# Patient Record
Sex: MALE | Race: BLACK OR AFRICAN AMERICAN
[De-identification: names, ages, dates, MRNs, and addresses within clinical notes are randomized per-mention and may not be internally consistent; named-entity substitution may affect disease eponyms.]

---

## 2017-12-09 ENCOUNTER — HOSPITAL ENCOUNTER (OUTPATIENT)
Dept: HOSPITAL 76 - EMS | Age: 22
End: 2017-12-09
Attending: SURGERY
Payer: COMMERCIAL

## 2017-12-09 DIAGNOSIS — S51.812A: Primary | ICD-10-CM
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
## 2017-12-30 ENCOUNTER — HOSPITAL ENCOUNTER (OUTPATIENT)
Dept: HOSPITAL 76 - EMS | Age: 22
Discharge: ANOTHER HOSPITAL | End: 2017-12-30
Attending: SURGERY
Payer: COMMERCIAL

## 2017-12-30 ENCOUNTER — HOSPITAL ENCOUNTER (EMERGENCY)
Dept: HOSPITAL 76 - ED | Age: 22
Discharge: HOME | End: 2017-12-30
Payer: COMMERCIAL

## 2017-12-30 VITALS — DIASTOLIC BLOOD PRESSURE: 87 MMHG | SYSTOLIC BLOOD PRESSURE: 127 MMHG

## 2017-12-30 DIAGNOSIS — S51.812A: Primary | ICD-10-CM
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
## 2017-12-30 DIAGNOSIS — Z91.5: ICD-10-CM
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
## 2017-12-30 DIAGNOSIS — R03.0: ICD-10-CM

## 2017-12-30 DIAGNOSIS — K21.9: ICD-10-CM
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
## 2017-12-30 DIAGNOSIS — J45.909: ICD-10-CM
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
## 2017-12-30 LAB
ALBUMIN DIAFP-MCNC: 5 G/DL (ref 3.2–5.5)
ALBUMIN/GLOB SERPL: 1.9 {RATIO} (ref 1–2.2)
ALP SERPL-CCNC: 58 IU/L (ref 42–121)
ALT SERPL W P-5'-P-CCNC: 20 IU/L (ref 10–60)
ANION GAP SERPL CALCULATED.4IONS-SCNC: 11 MMOL/L (ref 6–13)
APAP SERPL-MCNC: < 10 UG/ML (ref 10–30)
AST SERPL W P-5'-P-CCNC: 25 IU/L (ref 10–42)
BASOPHILS NFR BLD AUTO: 0 10^3/UL (ref 0–0.1)
BASOPHILS NFR BLD AUTO: 0.8 %
BILIRUB BLD-MCNC: 0.9 MG/DL (ref 0.2–1)
BUN SERPL-MCNC: 15 MG/DL (ref 6–20)
CALCIUM UR-MCNC: 10.2 MG/DL (ref 8.5–10.3)
CHLORIDE SERPL-SCNC: 102 MMOL/L (ref 101–111)
CO2 SERPL-SCNC: 28 MMOL/L (ref 21–32)
CREAT SERPLBLD-SCNC: 1 MG/DL (ref 0.6–1.2)
EOSINOPHIL # BLD AUTO: 0.4 10^3/UL (ref 0–0.7)
EOSINOPHIL NFR BLD AUTO: 6.4 %
ERYTHROCYTE [DISTWIDTH] IN BLOOD BY AUTOMATED COUNT: 13.6 % (ref 12–15)
GFRSERPLBLD MDRD-ARVRAT: 111 ML/MIN/{1.73_M2} (ref 89–?)
GLOBULIN SER-MCNC: 2.6 G/DL (ref 2.1–4.2)
GLUCOSE SERPL-MCNC: 95 MG/DL (ref 70–100)
HGB UR QL STRIP: 15 G/DL (ref 14–18)
LIPASE SERPL-CCNC: 21 U/L (ref 22–51)
LYMPHOCYTES # SPEC AUTO: 2.2 10^3/UL (ref 1.5–3.5)
LYMPHOCYTES NFR BLD AUTO: 36 %
MCH RBC QN AUTO: 30 PG (ref 27–31)
MCHC RBC AUTO-ENTMCNC: 34.4 G/DL (ref 32–36)
MCV RBC AUTO: 87.1 FL (ref 80–94)
MONOCYTES # BLD AUTO: 0.4 10^3/UL (ref 0–1)
MONOCYTES NFR BLD AUTO: 6.4 %
NEUTROPHILS # BLD AUTO: 3 10^3/UL (ref 1.5–6.6)
NEUTROPHILS # SNV AUTO: 6 X10^3/UL (ref 4.8–10.8)
NEUTROPHILS NFR BLD AUTO: 50.4 %
PDW BLD AUTO: 9.1 FL (ref 7.4–11.4)
PLATELET # BLD: 192 10^3/UL (ref 130–450)
PROT SPEC-MCNC: 7.6 G/DL (ref 6.7–8.2)
RBC MAR: 4.99 10^6/UL (ref 4.7–6.1)
SALICYLATES SERPL-MCNC: < 6 MG/DL
SODIUM SERPLBLD-SCNC: 141 MMOL/L (ref 135–145)

## 2017-12-30 PROCEDURE — 85025 COMPLETE CBC W/AUTO DIFF WBC: CPT

## 2017-12-30 PROCEDURE — 80329 ANALGESICS NON-OPIOID 1 OR 2: CPT

## 2017-12-30 PROCEDURE — 80053 COMPREHEN METABOLIC PANEL: CPT

## 2017-12-30 PROCEDURE — 83690 ASSAY OF LIPASE: CPT

## 2017-12-30 PROCEDURE — 99283 EMERGENCY DEPT VISIT LOW MDM: CPT

## 2017-12-30 PROCEDURE — 36415 COLL VENOUS BLD VENIPUNCTURE: CPT

## 2017-12-30 PROCEDURE — 12002 RPR S/N/AX/GEN/TRNK2.6-7.5CM: CPT

## 2017-12-30 PROCEDURE — 80307 DRUG TEST PRSMV CHEM ANLYZR: CPT

## 2017-12-30 PROCEDURE — 80320 DRUG SCREEN QUANTALCOHOLS: CPT

## 2017-12-30 NOTE — ED PHYSICIAN DOCUMENTATION
PD HPI MHE





- Stated complaint


Stated Complaint: MHE





- Chief complaint


Chief Complaint: MHE





- History obtained from


History obtained from: Patient





- History of Present Illness


Primary symptom: Other (21yo with H/O self-cutting. Cut LUE today after being 

upset at something. No SI. Tetanus is UTD.)





Review of Systems


Constitutional: reports: Reviewed and negative


Nose: reports: Reviewed and negative


Throat: reports: Reviewed and negative





PD PAST MEDICAL HISTORY





- Past Medical History


Respiratory: Asthma


GI: GERD


Psych: Depression





- Past Surgical History


Past Surgical History: Yes





- Present Medications


Home Medications: 


 Ambulatory Orders











 Medication  Instructions  Recorded  Confirmed


 


Cephalexin [Keflex] 500 mg PO QID #20 capsule 12/30/17 














- Allergies


Allergies/Adverse Reactions: 


 Allergies











Allergy/AdvReac Type Severity Reaction Status Date / Time


 


divalproex sodium AdvReac  Emesis Verified 03/08/15 21:38





[From Depakote]     














- Social History


Does the pt smoke?: No


Smoking Status: Never smoker


Does the pt drink ETOH?: Yes


Does the pt have substance abuse?: No





- Immunizations


Immunizations are current?: Yes





- POLST


Patient has POLST: No





PD ED PE NORMAL





- Vitals


Vital signs reviewed: Yes





- General


General: Alert and oriented X 3, No acute distress





- HEENT


HEENT: PERRL, EOMI





- Neck


Neck: Supple, no meningeal sign, No bony TTP





- Cardiac


Cardiac: RRR, No murmur





- Respiratory


Respiratory: No respiratory distress, Clear bilaterally





- Abdomen


Abdomen: Non tender





- Extremities


Extremities: Other (4cm deep lac mid anterior forearm. Normal sensation in the 

hand. Unable to test tendon function during inital eval d/t pain, will check on 

lac repair.)





- Neuro


Neuro: Alert and oriented X 3, Normal speech





- Psych


Psych: Normal mood, Normal affect





Results





- Vitals


Vitals: 


 Vital Signs - 24 hr











  12/30/17





  12:12


 


Temperature 36.9 C


 


Heart Rate 62


 


Respiratory 15





Rate 


 


Blood Pressure 148/102 H


 


O2 Saturation 97








 Oxygen











O2 Source                      Room air

















- Labs


Labs: 


 Laboratory Tests











  12/30/17 12/30/17





  13:05 13:05


 


WBC  6.0 


 


RBC  4.99 


 


Hgb  15.0 


 


Hct  43.5 


 


MCV  87.1 


 


MCH  30.0 


 


MCHC  34.4 


 


RDW  13.6 


 


Plt Count  192 


 


MPV  9.1 


 


Neut #  3.0 


 


Lymph #  2.2 


 


Mono #  0.4 


 


Eos #  0.4 


 


Baso #  0.0 


 


Absolute Nucleated RBC  0.00 


 


Nucleated RBC %  0.0 


 


Sodium   141


 


Potassium   4.0


 


Chloride   102


 


Carbon Dioxide   28


 


Anion Gap   11.0


 


BUN   15


 


Creatinine   1.0


 


Estimated GFR (MDRD)   111


 


Glucose   95


 


Calcium   10.2


 


Total Bilirubin   0.9


 


AST   25


 


ALT   20


 


Alkaline Phosphatase   58


 


Total Protein   7.6


 


Albumin   5.0


 


Globulin   2.6


 


Albumin/Globulin Ratio   1.9


 


Lipase   21 L


 


Salicylates   < 6.0


 


Acetaminophen   < 10 L


 


Ethyl Alcohol   < 5.0














Procedures





- Laceration (location)


  ** L forearm


Length in cm: 4


Wound type: Linear


Neurovascular status: Sensory intact, Vascular intact


Tendon involvement: Other (I could not identify a tendon laceration in this 

wound, although the middle part of it was pretty deep.  He did have a lot of 

pain with flexion of the digits, especially the ring finger suggesting a tendon 

laceration or neck.)


Anesthesia: Lidocaine 1% with epi


Wound Preparation: Irrigated copiously NS


Skin layer closure: Nylon, Running, Size #-0 - enter number (4-0)


Other: Patient tolerated well, No complications, Neurovascular intact, Tetanus 

UTD


Complexity: Simple





- Splint (location)


  ** L arm


Splint applied by: Tech


Type of splint: Short arm, Volar cock up


Other: Patient tolerated well, No complications, Neurovascular intact





PD MEDICAL DECISION MAKING





- ED course


ED course: 





22-year-old gentleman cut himself without suicidal intent to the left forearm.  

He does have a neuropraxia that is mild the median nerve distribution and 

difficulty with flexion of especially the fourth digit although his strength 

seems intact.  It only seems to be in the muscle which corroborates with the 

location of the laceration which is a few centimeters distal to the elbow.  

Orthopedic consultants was called and he recommended splinting and follow-up 

with hand surgeon and the patient was so advised.  Seen by the  as 

well.





- Consults


Consults: Consulted (name) (Dr Mukherjee- on call ortho [phone]- Recommends 

following up with a hand surgeon and immobilization, no need for immediate 

repair.)





Departure





- Departure


Disposition: 01 Home, Self Care


Clinical Impression: 


 Laceration of skin





Condition: Good


Record reviewed to determine appropriate education?: Yes


Instructions:  ED Stress React


Prescriptions: 


Cephalexin [Keflex] 500 mg PO QID #20 capsule


Comments: 


The orthopedic consultant here recommends following up with a hand surgeon, the 

closest for you is going to be at the [facility], call [phone] on 

Tuesday for the next available appointment.  He should be seen within the week.





Your blood pressure was elevated today on check into the emergency department.  

This does not mean that you have hypertension, it is a common phenomenon to 

come to the emergency department and have elevated blood pressure.  I recommend 

that you see your primary care physician within the week to have it rechecked 

when you are feeling better.

## 2018-01-05 ENCOUNTER — HOSPITAL ENCOUNTER (EMERGENCY)
Dept: HOSPITAL 76 - ED | Age: 23
Discharge: HOME | End: 2018-01-05
Payer: COMMERCIAL

## 2018-01-05 VITALS — DIASTOLIC BLOOD PRESSURE: 93 MMHG | SYSTOLIC BLOOD PRESSURE: 147 MMHG
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
## 2018-01-05 DIAGNOSIS — S51.812D: Primary | ICD-10-CM

## 2018-01-05 PROCEDURE — 99283 EMERGENCY DEPT VISIT LOW MDM: CPT

## 2018-01-05 NOTE — ED PHYSICIAN DOCUMENTATION
PD HPI WOUND RECHECK





- Stated complaint


Stated Complaint: L ARM PX





- Chief complaint


Chief Complaint: Ext Problem





- Histroy obtained from


History obtained from: Patient, Friend





- History of Present Illness


Location: Left Uppper Extremity


Timing - onset: How many days ago


Similar symptoms before: Work up / diagnostics, Treatment


Recently seen: Emergency Dept





- Additional information


Additional information: 





Patient is a 22 year old male with a history of psychiatric disorders who is 

presenting to the emergency department for a wound check.  According to 

previous notes, patient was seen on 12/30/17 for self cutting.  patient's 

laceration was repaired at that time.  patient states that today it was 

bleeding so he came in for evaluation.  Upon initial evaluation the wound is 

well appearing, no sign of infection and no active bleeding.  





Review of Systems


Constitutional: denies: Fever, Chills


Eyes: reports: Reviewed and negative


Ears: reports: Reviewed and negative


Nose: reports: Reviewed and negative


Throat: reports: Reviewed and negative


Cardiac: reports: Reviewed and negative


Respiratory: reports: Reviewed and negative


GI: denies: Nausea, Vomiting


: reports: Reviewed and negative


Skin: reports: Lesions, Laceration (s)


Musculoskeletal: reports: Extremity pain


Neurologic: denies: Generalized weakness, Focal weakness, Numbness


Psychiatric: denies: Depressed, Suicidal


Immunocompromised: denies: Immunocompromised





PD PAST MEDICAL HISTORY





- Past Medical History


Respiratory: Asthma


GI: GERD


Psych: Depression





- Past Surgical History


Past Surgical History: Yes





- Present Medications


Home Medications: 


 Ambulatory Orders











 Medication  Instructions  Recorded  Confirmed


 


Cephalexin [Keflex] 500 mg PO QID #20 capsule 12/30/17 01/05/18














- Allergies


Allergies/Adverse Reactions: 


 Allergies











Allergy/AdvReac Type Severity Reaction Status Date / Time


 


divalproex sodium AdvReac  Emesis Verified 03/08/15 21:38





[From Depakote]     














- Social History


Does the pt smoke?: No


Smoking Status: Never smoker


Does the pt drink ETOH?: Yes


Does the pt have substance abuse?: No





- Immunizations


Immunizations are current?: Yes





- POLST


Patient has POLST: No





PD ED PE NORMAL





- Vitals


Vital signs reviewed: Yes





- General


General: Alert and oriented X 3, No acute distress





- HEENT


HEENT: Atraumatic, PERRL





- Cardiac


Cardiac: RRR





- Respiratory


Respiratory: No respiratory distress





- Abdomen


Abdomen: Non distended





- Neuro


Neuro: Alert and oriented X 3, No sensory deficit, Normal speech





PD ED PE EXPANDED





- Extremities


Extremities: Left forearm (healing laceration of left arm, no surrounding 

erythema, no purulent discharge), Motor intact, Sensory intact, Vascular intact

, Tendon intact





- Psych


Psych: Other (flat affect)





Results





- Vitals


Vitals: 





 Vital Signs - 24 hr











  01/05/18





  01:53


 


Temperature 36.6 C


 


Heart Rate 68


 


Respiratory 18





Rate 


 


Blood Pressure 147/93 H


 


O2 Saturation 100








 Oxygen











O2 Source                      Room air

















PD MEDICAL DECISION MAKING





- ED course


Complexity details: reviewed old records, re-evaluated patient, considered 

differential, d/w patient


ED course: 





Patient was seen and examined at bedside.  patient's wound was well healing.  

patient did not seem to have any tendon involvement, and his strength was still 

slightly diminished but improving.  bacitracin was placed on the wound.  

patient required no further testing at this time and was stable for discharge 

with outpatient follow up.  





Departure





- Departure


Disposition: 01 Home, Self Care


Clinical Impression: 


 Lacerations of multiple sites of left arm





Condition: Good


Instructions:  ED Laceration All


Follow-Up: 


Kike Elizabeth MD [Provider Admit Priv/Credential] - 


Comments: 


Your wound looks well appearing today.  You should continue to keep it clean 

and dry.  You can apply topical antibiotics as needed.  You should call dr. Elizabeth's office to set up an appointment.  You sutures probably require 

another 3-5 days before they are able to be taken out.  You can take motrin or 

tylenol as needed for pain.

## 2018-11-19 ENCOUNTER — HOSPITAL ENCOUNTER (EMERGENCY)
Dept: HOSPITAL 76 - ED | Age: 23
Discharge: HOME | End: 2018-11-19
Payer: COMMERCIAL

## 2018-11-19 VITALS — SYSTOLIC BLOOD PRESSURE: 124 MMHG | DIASTOLIC BLOOD PRESSURE: 65 MMHG

## 2018-11-19 DIAGNOSIS — R03.0: ICD-10-CM

## 2018-11-19 DIAGNOSIS — K04.7: Primary | ICD-10-CM

## 2018-11-19 PROCEDURE — 99283 EMERGENCY DEPT VISIT LOW MDM: CPT

## 2018-11-19 RX ADMIN — CLINDAMYCIN HYDROCHLORIDE STA MG: 150 CAPSULE ORAL at 19:29

## 2018-11-19 RX ADMIN — HYDROCODONE BITARTRATE AND ACETAMINOPHEN STA TAB: 5; 325 TABLET ORAL at 19:30

## 2018-11-19 NOTE — ED PHYSICIAN DOCUMENTATION
PD HPI HEENT





- Stated complaint


Stated Complaint: TOOTH PX





- Chief complaint


Chief Complaint: Heent





- History obtained from


History obtained from: Patient





- History of Present Illness


Timing - onset: Other (Several days of increasing right mandibular dental pain 

with a lump there.  No fevers.)





Review of Systems


Constitutional: denies: Fever, Chills


Nose: denies: Rhinorrhea / runny nose, Congestion


Throat: reports: Dental pain / toothache.  denies: Sore throat





PD PAST MEDICAL HISTORY





- Past Medical History


Past Medical History: Yes


Cardiovascular: None


Respiratory: Asthma


Neuro: None


Endocrine/Autoimmune: None


GI: GERD


: None


HEENT: None


Psych: Depression


Musculoskeletal: None


Derm: None





- Past Surgical History


Past Surgical History: Yes





- Present Medications


Home Medications: 


                                Ambulatory Orders











 Medication  Instructions  Recorded  Confirmed


 


Clindamycin HCl [Clindamycin 300MG 300 mg PO Q6H #28 capsule 11/19/18 





CAP]   


 


Hydrocodone/Acetaminophen 1 - 2 each PO Q6H PRN #14 tablet 11/19/18 





[Hydrocodon-Acetaminophen 5-325]   














- Allergies


Allergies/Adverse Reactions: 


                                    Allergies











Allergy/AdvReac Type Severity Reaction Status Date / Time


 


divalproex sodium AdvReac  Emesis Verified 11/19/18 18:55





[From Depakote]     














- Social History


Does the pt smoke?: No


Smoking Status: Never smoker


Does the pt drink ETOH?: Yes


Does the pt have substance abuse?: No





- Immunizations


Immunizations are current?: Yes





- POLST


Patient has POLST: No





PD ED PE NORMAL





- Vitals


Vital signs reviewed: Yes





- General


General: Alert and oriented X 3, No acute distress





- HEENT


HEENT: Other (There is a pointed abscess on the right mandibular gumline lateral

to a premolar which I was able to unroofed and drained just with my finger.  No 

trismus or sublingual edema.)





- Neck


Neck: Supple, no meningeal sign, No bony TTP





- Neuro


Neuro: Alert and oriented X 3, Normal speech





Results





- Vitals


Vitals: 





                               Vital Signs - 24 hr











  11/19/18 11/19/18





  18:52 19:13


 


Temperature 36.9 C 


 


Heart Rate 82 


 


Respiratory 15 16





Rate  


 


Blood Pressure 160/80 H 


 


O2 Saturation 98 








                                     Oxygen











O2 Source                      Room air

















Departure





- Departure


Disposition: 01 Home, Self Care


Clinical Impression: 


 Dental abscess





Condition: Good


Record reviewed to determine appropriate education?: Yes


Instructions:  ED Abscess Dental


Prescriptions: 


Clindamycin HCl [Clindamycin 300MG CAP] 300 mg PO Q6H #28 capsule


Hydrocodone/Acetaminophen [Hydrocodon-Acetaminophen 5-325] 1 - 2 each PO Q6H PRN

#14 tablet


 PRN Reason: pain


Comments: 


It is very important that you follow-up with a dentist.  When it comes to dental

problems like yours, the emergency department can only offer a short-term 

solution to your long-term problem.  A couple of low cost options for dental 

care include:


[facility] in [place], calls [phone] for an appointment


Or


[facility] in [place], call [phone] for an 

appointment.





Your blood pressure was elevated today on check into the emergency department.  

This does not mean that you have hypertension, it is a common phenomenon to come

to the emergency department and have elevated blood pressure.  I recommend that 

you see your primary care physician within the week to have it rechecked when 

you are feeling better.

## 2019-03-31 ENCOUNTER — HOSPITAL ENCOUNTER (EMERGENCY)
Dept: HOSPITAL 76 - ED | Age: 24
LOS: 1 days | Discharge: HOME | End: 2019-04-01
Payer: COMMERCIAL

## 2019-03-31 DIAGNOSIS — F32.9: Primary | ICD-10-CM

## 2019-03-31 DIAGNOSIS — F15.90: ICD-10-CM

## 2019-03-31 DIAGNOSIS — R45.851: ICD-10-CM

## 2019-03-31 LAB
ALBUMIN DIAFP-MCNC: 5.1 G/DL (ref 3.2–5.5)
ALBUMIN/GLOB SERPL: 1.8 {RATIO} (ref 1–2.2)
ALP SERPL-CCNC: 85 IU/L (ref 42–121)
ALT SERPL W P-5'-P-CCNC: 26 IU/L (ref 10–60)
AMPHET UR QL SCN: POSITIVE
ANION GAP SERPL CALCULATED.4IONS-SCNC: 13 MMOL/L (ref 6–13)
AST SERPL W P-5'-P-CCNC: 49 IU/L (ref 10–42)
BASOPHILS NFR BLD AUTO: 0.1 10^3/UL (ref 0–0.1)
BASOPHILS NFR BLD AUTO: 0.9 %
BENZODIAZ UR QL SCN: NEGATIVE
BILIRUB BLD-MCNC: 1.7 MG/DL (ref 0.2–1)
BUN SERPL-MCNC: 21 MG/DL (ref 6–20)
CALCIUM UR-MCNC: 10 MG/DL (ref 8.5–10.3)
CHLORIDE SERPL-SCNC: 97 MMOL/L (ref 101–111)
CO2 SERPL-SCNC: 28 MMOL/L (ref 21–32)
COCAINE UR-SCNC: NEGATIVE UMOL/L
CREAT SERPLBLD-SCNC: 1.2 MG/DL (ref 0.6–1.2)
EOSINOPHIL # BLD AUTO: 0.3 10^3/UL (ref 0–0.7)
EOSINOPHIL NFR BLD AUTO: 5.1 %
ERYTHROCYTE [DISTWIDTH] IN BLOOD BY AUTOMATED COUNT: 13.1 % (ref 12–15)
GFRSERPLBLD MDRD-ARVRAT: 89 ML/MIN/{1.73_M2} (ref 89–?)
GLOBULIN SER-MCNC: 2.9 G/DL (ref 2.1–4.2)
GLUCOSE SERPL-MCNC: 69 MG/DL (ref 70–100)
HGB UR QL STRIP: 15.4 G/DL (ref 14–18)
LIPASE SERPL-CCNC: 38 U/L (ref 22–51)
LYMPHOCYTES # SPEC AUTO: 2.4 10^3/UL (ref 1.5–3.5)
LYMPHOCYTES NFR BLD AUTO: 39.2 %
MCH RBC QN AUTO: 29.8 PG (ref 27–31)
MCHC RBC AUTO-ENTMCNC: 34.2 G/DL (ref 32–36)
MCV RBC AUTO: 87.1 FL (ref 80–94)
METHADONE UR QL SCN: NEGATIVE
METHAMPHET UR QL SCN: POSITIVE
MONOCYTES # BLD AUTO: 0.5 10^3/UL (ref 0–1)
MONOCYTES NFR BLD AUTO: 7.8 %
NEUTROPHILS # BLD AUTO: 2.9 10^3/UL (ref 1.5–6.6)
NEUTROPHILS # SNV AUTO: 6.2 X10^3/UL (ref 4.8–10.8)
NEUTROPHILS NFR BLD AUTO: 47 %
OPIATES UR QL SCN: NEGATIVE
PDW BLD AUTO: 8.9 FL (ref 7.4–11.4)
PLATELET # BLD: 210 10^3/UL (ref 130–450)
PROT SPEC-MCNC: 8 G/DL (ref 6.7–8.2)
RBC MAR: 5.18 10^6/UL (ref 4.7–6.1)
SODIUM SERPLBLD-SCNC: 138 MMOL/L (ref 135–145)
VOLATILE DRUGS POS SERPL SCN: (no result)

## 2019-03-31 PROCEDURE — 36415 COLL VENOUS BLD VENIPUNCTURE: CPT

## 2019-03-31 PROCEDURE — 80053 COMPREHEN METABOLIC PANEL: CPT

## 2019-03-31 PROCEDURE — 80306 DRUG TEST PRSMV INSTRMNT: CPT

## 2019-03-31 PROCEDURE — 99282 EMERGENCY DEPT VISIT SF MDM: CPT

## 2019-03-31 PROCEDURE — 85025 COMPLETE CBC W/AUTO DIFF WBC: CPT

## 2019-03-31 PROCEDURE — 83690 ASSAY OF LIPASE: CPT

## 2019-03-31 PROCEDURE — 80320 DRUG SCREEN QUANTALCOHOLS: CPT

## 2019-03-31 PROCEDURE — 99283 EMERGENCY DEPT VISIT LOW MDM: CPT

## 2019-03-31 NOTE — ED PHYSICIAN DOCUMENTATION
PD HPI MHE





- Stated complaint


Stated Complaint: SI





- Chief complaint


Chief Complaint: MHE





- History obtained from


History obtained from: Patient





- History of Present Illness


Primary symptom: Suicidal ideation, Depression


Timing - onset: Other (gradually worsening over past several days)


Contributing factors: Substance abuse - drugs (methamphetamines)





- Additional information


Additional information: 





c/o suicidal ideation. He has been feeling increasingly depressed, with 

suicidality and feelings of hopelessness, over past few days. He had been clean 

of methamphetamine use for several months but started using again 5 days ago.





Review of Systems


Cardiac: reports: Reviewed and negative


Respiratory: reports: Reviewed and negative


GI: reports: Reviewed and negative


Psychiatric: reports: Depressed, Suicidal, Insomnia.  denies: Homicidal, 

Hallucinations, Delusions





PD PAST MEDICAL HISTORY





- Past Medical History


Cardiovascular: None


Respiratory: Asthma


Neuro: None


Endocrine/Autoimmune: None


GI: GERD


: None


HEENT: None


Psych: Depression


Musculoskeletal: None


Derm: None





- Past Surgical History


Past Surgical History: Yes





- Present Medications


Home Medications: 


                                Ambulatory Orders











 Medication  Instructions  Recorded  Confirmed


 


Clindamycin HCl [Clindamycin 300MG 300 mg PO Q6H #28 capsule 11/19/18 





CAP]   


 


Hydrocodone/Acetaminophen 1 - 2 each PO Q6H PRN #14 tablet 11/19/18 





[Hydrocodon-Acetaminophen 5-325]   














- Allergies


Allergies/Adverse Reactions: 


                                    Allergies











Allergy/AdvReac Type Severity Reaction Status Date / Time


 


divalproex sodium AdvReac  Emesis Verified 11/19/18 18:55





[From Depakote]     














- Social History


Does the pt smoke?: No


Smoking Status: Never smoker


Does the pt drink ETOH?: Yes


Does the pt have substance abuse?: No





- Immunizations


Immunizations are current?: Yes





- POLST


Patient has POLST: No





PD ED PE NORMAL





- Vitals


Vital signs reviewed: Yes





- General


General: Alert and oriented X 3, Well developed/nourished, Other (crying, poor 

eye contact)





- HEENT


HEENT: PERRL, EOMI, Moist mucous membranes





- Cardiac


Cardiac: RRR, No murmur





- Respiratory


Respiratory: No respiratory distress, Clear bilaterally





- Abdomen


Abdomen: Soft, Non tender





Results





- Vitals


Vitals: 


                               Vital Signs - 24 hr











  03/31/19 03/31/19 04/01/19





  20:04 20:24 04:59


 


Temperature 36.7 C  36.6 C


 


Heart Rate 85 102 H 52 L


 


Respiratory 20 18 14





Rate   


 


Blood Pressure 138/82 H 140/92 H 114/70


 


O2 Saturation 98 100 100














  04/01/19





  09:32


 


Temperature 36.4 C L


 


Heart Rate 54 L


 


Respiratory 14





Rate 


 


Blood Pressure 113/68


 


O2 Saturation 100








                                     Oxygen











O2 Source                      Room air

















- Labs


Labs: 


                                Laboratory Tests











  03/31/19 03/31/19 03/31/19





  20:20 20:20 22:50


 


WBC   6.2 


 


RBC   5.18 


 


Hgb   15.4 


 


Hct   45.1 


 


MCV   87.1 


 


MCH   29.8 


 


MCHC   34.2 


 


RDW   13.1 


 


Plt Count   210 


 


MPV   8.9 


 


Neut # (Auto)   2.9 


 


Lymph # (Auto)   2.4 


 


Mono # (Auto)   0.5 


 


Eos # (Auto)   0.3 


 


Baso # (Auto)   0.1 


 


Absolute Nucleated RBC   0.01 


 


Nucleated RBC %   0.1 


 


Sodium  138  


 


Potassium  3.5  


 


Chloride  97 L  


 


Carbon Dioxide  28  


 


Anion Gap  13.0  


 


BUN  21 H  


 


Creatinine  1.2  


 


Estimated GFR (MDRD)  89  


 


Glucose  69 L  


 


Calcium  10.0  


 


Total Bilirubin  1.7 H  


 


AST  49 H  


 


ALT  26  


 


Alkaline Phosphatase  85  


 


Total Protein  8.0  


 


Albumin  5.1  


 


Globulin  2.9  


 


Albumin/Globulin Ratio  1.8  


 


Lipase  38  


 


Urine Opiates Screen    NEGATIVE


 


Ur Oxycodone Screen    NEGATIVE


 


Urine Methadone Screen    NEGATIVE


 


Ur Propoxyphene Screen    NEGATIVE


 


Ur Barbiturates Screen    NEGATIVE


 


Ur Tricyclics Screen    NEGATIVE


 


Ur Phencyclidine Scrn    NEGATIVE


 


Ur Amphetamine Screen    POSITIVE H


 


U Methamphetamines Scrn    POSITIVE H


 


U Benzodiazepines Scrn    NEGATIVE


 


Urine Cocaine Screen    NEGATIVE


 


U Cannabinoids Screen    POSITIVE H


 


Ethyl Alcohol  < 5.0  














PD MEDICAL DECISION MAKING





- ED course


Complexity details: reviewed old records, reviewed results, re-evaluated 

patient, considered differential, d/w patient





Departure





- Departure


Disposition: 01 Home, Self Care


Clinical Impression: 


 Depression, Methamphetamine use





Condition: Good


Instructions:  ED Depression, ED Drug Abuse General


Follow-Up: 


[facility] [Provider Group]


Discharge Date/Time: 04/01/19 09:33

## 2019-04-01 VITALS — DIASTOLIC BLOOD PRESSURE: 68 MMHG | SYSTOLIC BLOOD PRESSURE: 113 MMHG

## 2020-06-19 ENCOUNTER — HOSPITAL ENCOUNTER (EMERGENCY)
Dept: HOSPITAL 76 - ED | Age: 25
Discharge: HOME | End: 2020-06-19
Payer: COMMERCIAL

## 2020-06-19 VITALS — SYSTOLIC BLOOD PRESSURE: 140 MMHG | DIASTOLIC BLOOD PRESSURE: 60 MMHG
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
## 2020-06-19 DIAGNOSIS — S61.111A: Primary | ICD-10-CM

## 2020-06-19 PROCEDURE — 11730 AVULSION NAIL PLATE SIMPLE 1: CPT

## 2020-06-19 PROCEDURE — 99283 EMERGENCY DEPT VISIT LOW MDM: CPT

## 2020-06-19 PROCEDURE — 99282 EMERGENCY DEPT VISIT SF MDM: CPT

## 2020-06-19 NOTE — ED PHYSICIAN DOCUMENTATION
History of Present Illness





- Stated complaint


Stated Complaint: RT THUMB LAC





- Chief complaint


Chief Complaint: Wound





- History obtained from


History obtained from: Patient





- Additonal information


Additional information: 





24-year-old male states that he injured his right thumb approximately 3 to 4 

weeks ago while working on a ride at a [employer].  He states that he had it again

today.  He states the nail has been falling off for the past 3 to 4 weeks.  

Decided to come in for evaluation today.  No drainage.  Worse with palpation, 

nothing makes it better.  Tetanus up-to-date.  Patient is right-handed.





Review of Systems


Constitutional: denies: Fever


Skin: denies: Rash





PD PAST MEDICAL HISTORY





- Past Medical History


Cardiovascular: None


Respiratory: Asthma


Neuro: None


Endocrine/Autoimmune: None


GI: GERD


: None


HEENT: None


Psych: Depression


Musculoskeletal: None


Derm: None





- Past Surgical History


Past Surgical History: Yes





- Present Medications


Home Medications: 


                                Ambulatory Orders











 Medication  Instructions  Recorded  Confirmed


 


Clindamycin HCl [Clindamycin 300MG 300 mg PO Q6H #28 capsule 11/19/18 





CAP]   


 


Hydrocodone/Acetaminophen 1 - 2 each PO Q6H PRN #14 tablet 11/19/18 





[Hydrocodon-Acetaminophen 5-325]   














- Allergies


Allergies/Adverse Reactions: 


                                    Allergies











Allergy/AdvReac Type Severity Reaction Status Date / Time


 


divalproex sodium AdvReac  Emesis Verified 06/19/20 15:42





[From Depakote]     














- Social History


Does the pt smoke?: No


Smoking Status: Never smoker


Does the pt drink ETOH?: Yes


Does the pt have substance abuse?: No





- Immunizations


Immunizations are current?: Yes





- POLST


Patient has POLST: No





PD ED PE NORMAL





- Vitals


Vital signs reviewed: Yes





- General


General: Alert and oriented X 3, No acute distress





- Derm


Derm: Warm and dry





- Extremities


Extremities: Other (R thumb - nail is elevated and partially removed. no 

swelling. no drainage. NVI)





- Neuro


Neuro: Alert and oriented X 3





Results





- Vitals


Vitals: 


                               Vital Signs - 24 hr











  06/19/20





  15:42


 


Temperature 36.8 C


 


Heart Rate 72


 


Respiratory 17





Rate 


 


Blood Pressure 147/58 H


 


O2 Saturation 98








                                     Oxygen











O2 Source                      Room air

















Procedures





- General procedure


General procedure: 





1% buffered lidocaine block around the R thumb and under the nail.  NVI. pulp is

soft. nail removed with forceps. xeroform applied. gauze lightly wrapped around 

the finger. Patient tolerated well





PD MEDICAL DECISION MAKING





- ED course


Complexity details: considered differential, d/w patient


ED course: 





Nail was removed.  Tolerated well.  No signs of infection.  Finger is bandaged 

with Xeroform and gauze.  Warnings of infection and instructions on wound care 

given at bedside. He was counseled regarding the need for close follow-up and 

that the nail may not grow back correctly.  Patient counseled regarding signs 

and symptoms for which I believe and urgent re-evaluation would be necessary. 

Patient with good understanding of and agreement to plan and is comfortable 

going home at this time





This document was made in part using voice recognition software. While efforts 

are made to proofread this document, sound alike and grammatical errors may 

occur.





Departure





- Departure


Disposition: 01 Home, Self Care


Clinical Impression: 


Thumbnail injury


Qualifiers:


 Encounter type: initial encounter Laterality: right Qualified Code(s): S69.91XA

- Unspecified injury of right wrist, hand and finger(s), initial encounter





Condition: Good


Instructions:  ED Removal Nail


Follow-Up: 


Your,doctor in 3-5 days [Other]


Comments: 


Follow-up with your doctor in 3 to 5 days for wound check.  Return if you 

worsen.  You can remove the yellow Xeroform dressing in approximately 2 to 3 

days.  Soak your thumb in warm water before removing this. Return if you notice 

redness, swelling or drainage from the wound.

## 2020-08-29 ENCOUNTER — HOSPITAL ENCOUNTER (EMERGENCY)
Dept: HOSPITAL 76 - ED | Age: 25
Discharge: HOME | End: 2020-08-29
Payer: MEDICAID

## 2020-08-29 VITALS — SYSTOLIC BLOOD PRESSURE: 135 MMHG | DIASTOLIC BLOOD PRESSURE: 81 MMHG

## 2020-08-29 DIAGNOSIS — R10.84: Primary | ICD-10-CM

## 2020-08-29 DIAGNOSIS — B85.0: ICD-10-CM

## 2020-08-29 LAB
ALBUMIN DIAFP-MCNC: 4.6 G/DL (ref 3.2–5.5)
ALBUMIN/GLOB SERPL: 1.8 {RATIO} (ref 1–2.2)
ALP SERPL-CCNC: 65 IU/L (ref 42–121)
ALT SERPL W P-5'-P-CCNC: 24 IU/L (ref 10–60)
AMPHET UR QL SCN: NEGATIVE
ANION GAP SERPL CALCULATED.4IONS-SCNC: 8 MMOL/L (ref 6–13)
AST SERPL W P-5'-P-CCNC: 26 IU/L (ref 10–42)
BASOPHILS NFR BLD AUTO: 0 10^3/UL (ref 0–0.1)
BASOPHILS NFR BLD AUTO: 0.8 %
BENZODIAZ UR QL SCN: NEGATIVE
BILIRUB BLD-MCNC: 1.1 MG/DL (ref 0.2–1)
BUN SERPL-MCNC: 20 MG/DL (ref 6–20)
CALCIUM UR-MCNC: 9.7 MG/DL (ref 8.5–10.3)
CHLORIDE SERPL-SCNC: 101 MMOL/L (ref 101–111)
CLARITY UR REFRACT.AUTO: (no result)
CO2 SERPL-SCNC: 30 MMOL/L (ref 21–32)
COCAINE UR-SCNC: NEGATIVE UMOL/L
CREAT SERPLBLD-SCNC: 1 MG/DL (ref 0.6–1.2)
EOSINOPHIL # BLD AUTO: 0.3 10^3/UL (ref 0–0.7)
EOSINOPHIL NFR BLD AUTO: 6.3 %
ERYTHROCYTE [DISTWIDTH] IN BLOOD BY AUTOMATED COUNT: 12.6 % (ref 12–15)
GLOBULIN SER-MCNC: 2.6 G/DL (ref 2.1–4.2)
GLUCOSE SERPL-MCNC: 100 MG/DL (ref 70–100)
GLUCOSE UR QL STRIP.AUTO: NEGATIVE MG/DL
HGB UR QL STRIP: 14.8 G/DL (ref 14–18)
KETONES UR QL STRIP.AUTO: NEGATIVE MG/DL
LIPASE SERPL-CCNC: 31 U/L (ref 22–51)
LYMPHOCYTES # SPEC AUTO: 1.7 10^3/UL (ref 1.5–3.5)
LYMPHOCYTES NFR BLD AUTO: 42 %
MCH RBC QN AUTO: 30.2 PG (ref 27–31)
MCHC RBC AUTO-ENTMCNC: 33.7 G/DL (ref 32–36)
MCV RBC AUTO: 89.6 FL (ref 80–94)
METHADONE UR QL SCN: NEGATIVE
METHAMPHET UR QL SCN: NEGATIVE
MONOCYTES # BLD AUTO: 0.2 10^3/UL (ref 0–1)
MONOCYTES NFR BLD AUTO: 5.6 %
NEUTROPHILS # BLD AUTO: 1.8 10^3/UL (ref 1.5–6.6)
NEUTROPHILS # SNV AUTO: 4 X10^3/UL (ref 4.8–10.8)
NEUTROPHILS NFR BLD AUTO: 45 %
NITRITE UR QL STRIP.AUTO: NEGATIVE
OPIATES UR QL SCN: NEGATIVE
PDW BLD AUTO: 11.4 FL (ref 7.4–11.4)
PH UR STRIP.AUTO: 8.5 PH (ref 5–7.5)
PLATELET # BLD: 169 10^3/UL (ref 130–450)
PROT SPEC-MCNC: 7.2 G/DL (ref 6.7–8.2)
PROT UR STRIP.AUTO-MCNC: NEGATIVE MG/DL
RBC # UR STRIP.AUTO: NEGATIVE /UL
RBC MAR: 4.9 10^6/UL (ref 4.7–6.1)
SODIUM SERPLBLD-SCNC: 139 MMOL/L (ref 135–145)
SP GR UR STRIP.AUTO: 1.01 (ref 1–1.03)
SQUAMOUS URNS QL MICRO: (no result)
UROBILINOGEN UR QL STRIP.AUTO: (no result) E.U./DL
UROBILINOGEN UR STRIP.AUTO-MCNC: NEGATIVE MG/DL
VOLATILE DRUGS POS SERPL SCN: (no result)

## 2020-08-29 PROCEDURE — 80306 DRUG TEST PRSMV INSTRMNT: CPT

## 2020-08-29 PROCEDURE — 87086 URINE CULTURE/COLONY COUNT: CPT

## 2020-08-29 PROCEDURE — 81001 URINALYSIS AUTO W/SCOPE: CPT

## 2020-08-29 PROCEDURE — 81003 URINALYSIS AUTO W/O SCOPE: CPT

## 2020-08-29 PROCEDURE — 80053 COMPREHEN METABOLIC PANEL: CPT

## 2020-08-29 PROCEDURE — 85025 COMPLETE CBC W/AUTO DIFF WBC: CPT

## 2020-08-29 PROCEDURE — 36415 COLL VENOUS BLD VENIPUNCTURE: CPT

## 2020-08-29 PROCEDURE — 99283 EMERGENCY DEPT VISIT LOW MDM: CPT

## 2020-08-29 PROCEDURE — 74177 CT ABD & PELVIS W/CONTRAST: CPT

## 2020-08-29 PROCEDURE — 99284 EMERGENCY DEPT VISIT MOD MDM: CPT

## 2020-08-29 PROCEDURE — 83690 ASSAY OF LIPASE: CPT
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
## 2020-08-29 NOTE — CT REPORT
PROCEDURE:  Abdomen/Pelvis W

 

INDICATIONS:  IV only abd pain

 

CONTRAST:  IV CONTRAST: Optiray 320 ml: 100 PO CONTRAST: *NO PO CONTRAST 

 

TECHNIQUE:  

After the administration of 100 mL of intravenous contrast, 5 mm thick sections acquired from the lisa
phragms to the symphysis.  5 mm thick coronal and sagittal reformats were acquired.  For radiation do
se reduction, the following was used:  automated exposure control, adjustment of mA and/or kV accordi
ng to patient size.  

 

COMPARISON:  7/23/2014

 

FINDINGS:  

Image quality:  Excellent.  

 

ABDOMEN:  

Lung bases:  Lung bases are clear.  Heart size is normal.  

 

Solid organs:  Liver and spleen are normal in size and enhancement.  Gallbladder is unremarkable.  Bi
liary system is non dilated.  Pancreas enhances normally.  No adrenal nodules.  Kidneys demonstrate n
ormal size and enhancement, without hydronephrosis.  

 

Peritoneum and bowel:  Bowel loops demonstrate normal wall thickness and caliber.  No free fluid or a
ir. Normal appendix. 

 

Nodes and vessels:  No retroperitoneal or mesenteric adenopathy by size criteria.  Aorta and inferior
 vena cava are normal in size.  

 

Miscellaneous:  No ventral hernias.  

 

 

PELVIS:  

Genitourinary:  Bladder wall thickness is normal.  

 

Miscellaneous:  No inguinal hernias or adenopathy.  

 

Bones:  No suspicious bony lesions.  No vertebral body compression fractures.  

 

IMPRESSION:  CT abdomen and pelvis without acute abnormalities to explain patient's symptoms.

 

Reviewed by: Remberto Lyons MD on 8/29/2020 12:46 PM PDT

Approved by: Remberto Lyons MD on 8/29/2020 12:46 PM PDT

 

 

Station ID:  [id number]

## 2020-09-08 ENCOUNTER — HOSPITAL ENCOUNTER (OUTPATIENT)
Dept: HOSPITAL 76 - EMS | Age: 25
Discharge: ANOTHER HOSPITAL | End: 2020-09-08
Attending: SURGERY
Payer: MEDICAID

## 2020-09-08 ENCOUNTER — HOSPITAL ENCOUNTER (EMERGENCY)
Dept: HOSPITAL 76 - ED | Age: 25
Discharge: PSYCHIATRIC OR SUBSTANCE USE FACILITY | End: 2020-09-08
Payer: MEDICAID

## 2020-09-08 VITALS — SYSTOLIC BLOOD PRESSURE: 114 MMHG | DIASTOLIC BLOOD PRESSURE: 67 MMHG

## 2020-09-08 DIAGNOSIS — Z20.828: ICD-10-CM

## 2020-09-08 DIAGNOSIS — R45.851: Primary | ICD-10-CM

## 2020-09-08 DIAGNOSIS — T60.1X2A: Primary | ICD-10-CM

## 2020-09-08 DIAGNOSIS — F32.9: ICD-10-CM

## 2020-09-08 LAB
ALBUMIN DIAFP-MCNC: 5 G/DL (ref 3.2–5.5)
ALBUMIN/GLOB SERPL: 1.6 {RATIO} (ref 1–2.2)
ALP SERPL-CCNC: 72 IU/L (ref 42–121)
ALT SERPL W P-5'-P-CCNC: 36 IU/L (ref 10–60)
AMPHET UR QL SCN: NEGATIVE
ANION GAP SERPL CALCULATED.4IONS-SCNC: 12 MMOL/L (ref 6–13)
APAP SERPL-MCNC: < 10 UG/ML (ref 10–30)
AST SERPL W P-5'-P-CCNC: 52 IU/L (ref 10–42)
BASE EXCESS BLDV CALC-SCNC: 3 MMOL/L
BASOPHILS NFR BLD AUTO: 0 10^3/UL (ref 0–0.1)
BASOPHILS NFR BLD AUTO: 0.7 %
BENZODIAZ UR QL SCN: NEGATIVE
BILIRUB BLD-MCNC: 1.3 MG/DL (ref 0.2–1)
BUN SERPL-MCNC: 15 MG/DL (ref 6–20)
CALCIUM UR-MCNC: 10.5 MG/DL (ref 8.5–10.3)
CHLORIDE SERPL-SCNC: 101 MMOL/L (ref 101–111)
CLARITY UR REFRACT.AUTO: CLEAR
CO2 BLDV-SCNC: 32 MMOL/L (ref 24–29)
CO2 SERPL-SCNC: 29 MMOL/L (ref 21–32)
COCAINE UR-SCNC: NEGATIVE UMOL/L
CREAT SERPLBLD-SCNC: 1.1 MG/DL (ref 0.6–1.2)
EOSINOPHIL # BLD AUTO: 0.3 10^3/UL (ref 0–0.7)
EOSINOPHIL NFR BLD AUTO: 7.4 %
ERYTHROCYTE [DISTWIDTH] IN BLOOD BY AUTOMATED COUNT: 12.7 % (ref 12–15)
GLOBULIN SER-MCNC: 3.1 G/DL (ref 2.1–4.2)
GLUCOSE SERPL-MCNC: 104 MG/DL (ref 70–100)
GLUCOSE UR QL STRIP.AUTO: NEGATIVE MG/DL
HGB UR QL STRIP: 15.5 G/DL (ref 14–18)
KETONES UR QL STRIP.AUTO: NEGATIVE MG/DL
LIPASE SERPL-CCNC: 43 U/L (ref 22–51)
LYMPHOCYTES # SPEC AUTO: 1.8 10^3/UL (ref 1.5–3.5)
LYMPHOCYTES NFR BLD AUTO: 43.5 %
MCH RBC QN AUTO: 29.7 PG (ref 27–31)
MCHC RBC AUTO-ENTMCNC: 33 G/DL (ref 32–36)
MCV RBC AUTO: 89.8 FL (ref 80–94)
METHADONE UR QL SCN: NEGATIVE
METHAMPHET UR QL SCN: NEGATIVE
MONOCYTES # BLD AUTO: 0.2 10^3/UL (ref 0–1)
MONOCYTES NFR BLD AUTO: 5.7 %
NEUTROPHILS # BLD AUTO: 1.7 10^3/UL (ref 1.5–6.6)
NEUTROPHILS # SNV AUTO: 4.1 X10^3/UL (ref 4.8–10.8)
NEUTROPHILS NFR BLD AUTO: 42.7 %
NITRITE UR QL STRIP.AUTO: NEGATIVE
OPIATES UR QL SCN: NEGATIVE
PCO2 BLDV: 60.6 MMHG (ref 41–51)
PDW BLD AUTO: 10.7 FL (ref 7.4–11.4)
PH BLDV: 7.3 [PH] (ref 7.31–7.41)
PH UR STRIP.AUTO: 7.5 PH (ref 5–7.5)
PLATELET # BLD: 206 10^3/UL (ref 130–450)
PO2 BLDV: 27 MMHG (ref 25–47)
PROT SPEC-MCNC: 8.1 G/DL (ref 6.7–8.2)
PROT UR STRIP.AUTO-MCNC: NEGATIVE MG/DL
RBC # UR STRIP.AUTO: NEGATIVE /UL
RBC MAR: 5.22 10^6/UL (ref 4.7–6.1)
SALICYLATES SERPL-MCNC: < 6 MG/DL
SODIUM SERPLBLD-SCNC: 142 MMOL/L (ref 135–145)
SP GR UR STRIP.AUTO: 1.02 (ref 1–1.03)
UROBILINOGEN UR QL STRIP.AUTO: (no result) E.U./DL
UROBILINOGEN UR STRIP.AUTO-MCNC: NEGATIVE MG/DL
VOLATILE DRUGS POS SERPL SCN: (no result)

## 2020-09-08 PROCEDURE — 80307 DRUG TEST PRSMV CHEM ANLYZR: CPT

## 2020-09-08 PROCEDURE — 80320 DRUG SCREEN QUANTALCOHOLS: CPT

## 2020-09-08 PROCEDURE — 80306 DRUG TEST PRSMV INSTRMNT: CPT

## 2020-09-08 PROCEDURE — 80053 COMPREHEN METABOLIC PANEL: CPT

## 2020-09-08 PROCEDURE — 87086 URINE CULTURE/COLONY COUNT: CPT

## 2020-09-08 PROCEDURE — 81001 URINALYSIS AUTO W/SCOPE: CPT

## 2020-09-08 PROCEDURE — 81003 URINALYSIS AUTO W/O SCOPE: CPT

## 2020-09-08 PROCEDURE — 99283 EMERGENCY DEPT VISIT LOW MDM: CPT

## 2020-09-08 PROCEDURE — 83690 ASSAY OF LIPASE: CPT

## 2020-09-08 PROCEDURE — 99285 EMERGENCY DEPT VISIT HI MDM: CPT

## 2020-09-08 PROCEDURE — 82803 BLOOD GASES ANY COMBINATION: CPT

## 2020-09-08 PROCEDURE — 36415 COLL VENOUS BLD VENIPUNCTURE: CPT

## 2020-09-08 PROCEDURE — 87635 SARS-COV-2 COVID-19 AMP PRB: CPT

## 2020-09-08 PROCEDURE — 80329 ANALGESICS NON-OPIOID 1 OR 2: CPT

## 2020-09-08 PROCEDURE — 85025 COMPLETE CBC W/AUTO DIFF WBC: CPT

## 2020-09-08 PROCEDURE — 84443 ASSAY THYROID STIM HORMONE: CPT

## 2020-09-08 NOTE — ED PHYSICIAN DOCUMENTATION
PD HPI MHE





- Stated complaint


Stated Complaint: SI





- Chief complaint


Chief Complaint: Neuro





- History obtained from


History obtained from: Patient





- History of Present Illness


Primary symptom: Self harm - OD (drank bottle of permethrin lice shampoo with 

intention of killing himself. Denies drugs or alcohol with it. Feeling depressed

and suicidal. Agreeable to getting help.), Depression


Timing - onset: How many hours ago (1-2), Today


Contributing factors: Out of meds.  No: Substance abuse - ETOH, Substance abuse 

- drugs


Similar symptoms before: Diagnosis (depression and self harm/suicide attempts)


Recently seen: Not recently seen





Review of Systems


Constitutional: denies: Fever


Eyes: denies: Loss of vision


Nose: denies: Rhinorrhea / runny nose, Congestion


Throat: denies: Sore throat


Cardiac: denies: Chest pain / pressure


Respiratory: denies: Cough


GI: reports: Abdominal Pain (mild upper abd after drinking the med. No 

vomiting.).  denies: Vomiting, Diarrhea


Skin: denies: Abrasion (s), Laceration (s)


Neurologic: denies: Focal weakness, Numbness, Near syncope, Altered mental 

status, Headache





PD PAST MEDICAL HISTORY





- Past Medical History


Cardiovascular: None


Respiratory: Asthma


Neuro: None


Endocrine/Autoimmune: None


GI: GERD


: None


HEENT: None


Psych: Depression


Musculoskeletal: None


Derm: None





- Past Surgical History


Past Surgical History: Yes





- Present Medications


Home Medications: 


                                Ambulatory Orders











 Medication  Instructions  Recorded  Confirmed


 


Clindamycin HCl [Clindamycin 300MG 300 mg PO Q6H #28 capsule 11/19/18 





CAP]   


 


Hydrocodone/Acetaminophen 1 - 2 each PO Q6H PRN #14 tablet 11/19/18 





[Hydrocodon-Acetaminophen 5-325]   


 


Dicyclomine HCl 20 mg PO QID PRN #20 tablet 08/29/20 


 


Piperonyl Butoxide/Pyrethrins 118 ml TP ONCE #2 bot 08/29/20 





[Lice Killing Shampoo]   














- Allergies


Allergies/Adverse Reactions: 


                                    Allergies











Allergy/AdvReac Type Severity Reaction Status Date / Time


 


divalproex sodium AdvReac  Emesis Verified 08/29/20 11:20





[From Depakote]     














- Social History


Does the pt smoke?: No


Smoking Status: Never smoker


Does the pt drink ETOH?: Yes


Does the pt have substance abuse?: No





- Immunizations


Immunizations are current?: Yes





- POLST


Patient has POLST: No





PD ED PE NORMAL





- Vitals


Vital signs reviewed: Yes





- General


General: Alert and oriented X 3, No acute distress, Well developed/nourished





- HEENT


HEENT: Atraumatic, Other (Scalp without any noted nits/lice. )





- Neck


Neck: Supple, no meningeal sign, No adenopathy





- Cardiac


Cardiac: RRR, No murmur





- Respiratory


Respiratory: Clear bilaterally





- Abdomen


Abdomen: Normal bowel sounds, Soft, Non tender, Non distended, No organomegaly





- Derm


Derm: Normal color, Warm and dry





- Extremities


Extremities: No tenderness to palpate, Normal ROM s pain, No edema, No calf 

tenderness / cord





- Neuro


Neuro: Alert and oriented X 3, No motor deficit, Normal speech





- Psych


Psych: No: Normal affect (depressed and flat affect. Brief answers to questions.

)





Results





- Vitals


Vitals: 


                               Vital Signs - 24 hr











  09/08/20





  14:10


 


Temperature 36.9 C


 


Heart Rate 64


 


Respiratory 20





Rate 


 


Blood Pressure 114/67


 


O2 Saturation 99








                                     Oxygen











O2 Source                      Room air

















- Labs


Labs: 


                                Laboratory Tests











  09/08/20 09/08/20 09/08/20





  14:30 14:35 14:35


 


WBC   4.1 L 


 


RBC   5.22 


 


Hgb   15.5 


 


Hct   46.9 


 


MCV   89.8 


 


MCH   29.7 


 


MCHC   33.0 


 


RDW   12.7 


 


Plt Count   206 


 


MPV   10.7 


 


Neut # (Auto)   1.7 


 


Lymph # (Auto)   1.8 


 


Mono # (Auto)   0.2 


 


Eos # (Auto)   0.3 


 


Baso # (Auto)   0.0 


 


Absolute Nucleated RBC   0.00 


 


Nucleated RBC %   0.0 


 


VBG pH   


 


VBG pCO2   


 


VBG pO2   


 


VBG HCO3   


 


VBG Total CO2   


 


VBG O2 Saturation   


 


VBG Base Excess   


 


Sodium    142


 


Potassium    4.2


 


Chloride    101


 


Carbon Dioxide    29


 


Anion Gap    12.0


 


BUN    15


 


Creatinine    1.1


 


Estimated GFR (MDRD)    98


 


Glucose    104 H


 


Calcium    10.5 H


 


Total Bilirubin    1.3 H


 


AST    52 H


 


ALT    36


 


Alkaline Phosphatase    72


 


Total Protein    8.1


 


Albumin    5.0


 


Globulin    3.1


 


Albumin/Globulin Ratio    1.6


 


Lipase    43


 


TSH   


 


Urine Color  YELLOW  


 


Urine Clarity  CLEAR  


 


Urine pH  7.5  


 


Ur Specific Gravity  1.020  


 


Urine Protein  NEGATIVE  


 


Urine Glucose (UA)  NEGATIVE  


 


Urine Ketones  NEGATIVE  


 


Urine Occult Blood  NEGATIVE  


 


Urine Nitrite  NEGATIVE  


 


Urine Bilirubin  NEGATIVE  


 


Urine Urobilinogen  0.2 (NORMAL)  


 


Ur Leukocyte Esterase  NEGATIVE  


 


Ur Microscopic Review  NOT INDICATED  


 


Urine Culture Comments  NOT INDICATED  


 


Salicylates    < 6.0


 


Urine Opiates Screen  NEGATIVE  


 


Ur Oxycodone Screen  NEGATIVE  


 


Urine Methadone Screen  NEGATIVE  


 


Ur Propoxyphene Screen  NEGATIVE  


 


Acetaminophen    < 10 L


 


Ur Barbiturates Screen  NEGATIVE  


 


Ur Tricyclics Screen  NEGATIVE  


 


Ur Phencyclidine Scrn  NEGATIVE  


 


Ur Amphetamine Screen  NEGATIVE  


 


U Methamphetamines Scrn  NEGATIVE  


 


U Benzodiazepines Scrn  NEGATIVE  


 


Urine Cocaine Screen  NEGATIVE  


 


U Cannabinoids Screen  POSITIVE H  


 


Ethyl Alcohol    < 5.0














  09/08/20 09/08/20





  14:35 14:35


 


WBC  


 


RBC  


 


Hgb  


 


Hct  


 


MCV  


 


MCH  


 


MCHC  


 


RDW  


 


Plt Count  


 


MPV  


 


Neut # (Auto)  


 


Lymph # (Auto)  


 


Mono # (Auto)  


 


Eos # (Auto)  


 


Baso # (Auto)  


 


Absolute Nucleated RBC  


 


Nucleated RBC %  


 


VBG pH   7.300 L


 


VBG pCO2   60.6 H


 


VBG pO2   27.0


 


VBG HCO3   29.8 H


 


VBG Total CO2   32.0 H


 


VBG O2 Saturation   42.0 L


 


VBG Base Excess   3.0 H


 


Sodium  


 


Potassium  


 


Chloride  


 


Carbon Dioxide  


 


Anion Gap  


 


BUN  


 


Creatinine  


 


Estimated GFR (MDRD)  


 


Glucose  


 


Calcium  


 


Total Bilirubin  


 


AST  


 


ALT  


 


Alkaline Phosphatase  


 


Total Protein  


 


Albumin  


 


Globulin  


 


Albumin/Globulin Ratio  


 


Lipase  


 


TSH  0.74 


 


Urine Color  


 


Urine Clarity  


 


Urine pH  


 


Ur Specific Gravity  


 


Urine Protein  


 


Urine Glucose (UA)  


 


Urine Ketones  


 


Urine Occult Blood  


 


Urine Nitrite  


 


Urine Bilirubin  


 


Urine Urobilinogen  


 


Ur Leukocyte Esterase  


 


Ur Microscopic Review  


 


Urine Culture Comments  


 


Salicylates  


 


Urine Opiates Screen  


 


Ur Oxycodone Screen  


 


Urine Methadone Screen  


 


Ur Propoxyphene Screen  


 


Acetaminophen  


 


Ur Barbiturates Screen  


 


Ur Tricyclics Screen  


 


Ur Phencyclidine Scrn  


 


Ur Amphetamine Screen  


 


U Methamphetamines Scrn  


 


U Benzodiazepines Scrn  


 


Urine Cocaine Screen  


 


U Cannabinoids Screen  


 


Ethyl Alcohol  














PD MEDICAL DECISION MAKING





- ED course


Complexity details: reviewed results, considered differential (The patient took 

a nonlethal overdose of medicine but with the intention of self-harm and not 

knowing the consequence of it.  He states he still feels depressed with some 

ideation of self-harm.  He does state he feels hospitalization is needed and 

helpful.  He is not currently on any medicines.), d/w patient





Departure





- Departure


Disposition: [facility]/Unit DC/Xfer


Clinical Impression: 


 Suicidal ideation





Intentional drug overdose


Qualifiers:


 Encounter type: initial encounter Qualified Code(s): T50.902A - Poisoning by 

unspecified drugs, medicaments and biological substances, intentional self-harm,

initial encounter





Depression


Qualifiers:


 Depression Type: unspecified Qualified Code(s): F32.9 - Major depressive 

disorder, single episode, unspecified





Condition: Stable


Record reviewed to determine appropriate education?: Yes


Discharge Date/Time: 09/08/20 19:14